# Patient Record
Sex: MALE | Race: ASIAN | NOT HISPANIC OR LATINO
[De-identification: names, ages, dates, MRNs, and addresses within clinical notes are randomized per-mention and may not be internally consistent; named-entity substitution may affect disease eponyms.]

---

## 2019-06-07 ENCOUNTER — APPOINTMENT (OUTPATIENT)
Dept: CARDIOLOGY | Facility: CLINIC | Age: 64
End: 2019-06-07
Payer: MEDICAID

## 2019-06-07 VITALS
BODY MASS INDEX: 23.46 KG/M2 | TEMPERATURE: 97.6 F | HEART RATE: 76 BPM | RESPIRATION RATE: 17 BRPM | OXYGEN SATURATION: 96 % | SYSTOLIC BLOOD PRESSURE: 151 MMHG | WEIGHT: 146 LBS | HEIGHT: 66 IN | DIASTOLIC BLOOD PRESSURE: 90 MMHG

## 2019-06-07 DIAGNOSIS — N40.0 BENIGN PROSTATIC HYPERPLASIA WITHOUT LOWER URINARY TRACT SYMPMS: ICD-10-CM

## 2019-06-07 DIAGNOSIS — F17.200 NICOTINE DEPENDENCE, UNSPECIFIED, UNCOMPLICATED: ICD-10-CM

## 2019-06-07 DIAGNOSIS — J45.909 UNSPECIFIED ASTHMA, UNCOMPLICATED: ICD-10-CM

## 2019-06-07 PROBLEM — Z00.00 ENCOUNTER FOR PREVENTIVE HEALTH EXAMINATION: Status: ACTIVE | Noted: 2019-06-07

## 2019-06-07 PROCEDURE — 93015 CV STRESS TEST SUPVJ I&R: CPT

## 2019-06-07 PROCEDURE — 93224 XTRNL ECG REC UP TO 48 HRS: CPT

## 2019-06-07 PROCEDURE — 93306 TTE W/DOPPLER COMPLETE: CPT

## 2019-06-07 PROCEDURE — 99204 OFFICE O/P NEW MOD 45 MIN: CPT | Mod: 25

## 2019-06-07 RX ORDER — NICOTINE TRANSDERMAL SYSTEM 14 MG/24H
14 PATCH, EXTENDED RELEASE TRANSDERMAL DAILY
Qty: 30 | Refills: 0 | Status: ACTIVE | COMMUNITY
Start: 2019-06-07 | End: 1900-01-01

## 2019-06-07 RX ORDER — NICOTINE TRANSDERMAL SYSTEM 7 MG/24H
7 PATCH, EXTENDED RELEASE TRANSDERMAL DAILY
Qty: 30 | Refills: 1 | Status: ACTIVE | COMMUNITY
Start: 2019-06-07 | End: 1900-01-01

## 2019-06-10 PROBLEM — F17.200 CURRENT SMOKER: Status: ACTIVE | Noted: 2019-06-10

## 2019-06-10 PROBLEM — J45.909 ASTHMA: Status: ACTIVE | Noted: 2019-06-10

## 2019-06-10 PROBLEM — N40.0 BPH (BENIGN PROSTATIC HYPERPLASIA): Status: ACTIVE | Noted: 2019-06-10

## 2019-06-10 RX ORDER — MONTELUKAST 10 MG/1
10 TABLET, FILM COATED ORAL
Qty: 30 | Refills: 0 | Status: ACTIVE | COMMUNITY
Start: 2018-12-29

## 2019-06-10 RX ORDER — ALBUTEROL SULFATE 90 UG/1
108 (90 BASE) AEROSOL, METERED RESPIRATORY (INHALATION)
Qty: 18 | Refills: 0 | Status: ACTIVE | COMMUNITY
Start: 2018-12-07

## 2019-06-10 NOTE — PHYSICAL EXAM
[General Appearance - Well Developed] : well developed [Normal Appearance] : normal appearance [Well Groomed] : well groomed [General Appearance - Well Nourished] : well nourished [No Deformities] : no deformities [General Appearance - In No Acute Distress] : no acute distress [Eyelids - No Xanthelasma] : the eyelids demonstrated no xanthelasmas [Normal Conjunctiva] : the conjunctiva exhibited no abnormalities [Normal Oral Mucosa] : normal oral mucosa [No Oral Pallor] : no oral pallor [Normal Jugular Venous A Waves Present] : normal jugular venous A waves present [No Oral Cyanosis] : no oral cyanosis [No Jugular Venous Cooley A Waves] : no jugular venous cooley A waves [Normal Jugular Venous V Waves Present] : normal jugular venous V waves present [Heart Sounds] : normal S1 and S2 [Heart Rate And Rhythm] : heart rate and rhythm were normal [Arterial Pulses Normal] : the arterial pulses were normal [Murmurs] : no murmurs present [Edema] : no peripheral edema present [Respiration, Rhythm And Depth] : normal respiratory rhythm and effort [Exaggerated Use Of Accessory Muscles For Inspiration] : no accessory muscle use [Auscultation Breath Sounds / Voice Sounds] : lungs were clear to auscultation bilaterally [Abdomen Tenderness] : non-tender [Abdomen Soft] : soft [Abdomen Mass (___ Cm)] : no abdominal mass palpated [Abnormal Walk] : normal gait [Gait - Sufficient For Exercise Testing] : the gait was sufficient for exercise testing [Petechial Hemorrhages (___cm)] : no petechial hemorrhages [Nail Clubbing] : no clubbing of the fingernails [Cyanosis, Localized] : no localized cyanosis [] : no ischemic changes [Oriented To Time, Place, And Person] : oriented to person, place, and time [Affect] : the affect was normal [Mood] : the mood was normal [No Anxiety] : not feeling anxious

## 2019-06-13 ENCOUNTER — NON-APPOINTMENT (OUTPATIENT)
Age: 64
End: 2019-06-13

## 2019-06-13 NOTE — HISTORY OF PRESENT ILLNESS
[FreeTextEntry1] : 63 year-old male smoker with HTN, asthma, BPH, presents for evaluation of CP.  Patient reports that a month ago he was experiencing CP not related to exertion.  Patient reports HAAS which he attributes to asthma.  Patient reports palpitations lasting seconds.  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  Patient reports that recently at night as he is falling asleep he has been experiencing sudden body jerks waking him up.  He is concerned because he has never had it before.

## 2019-06-13 NOTE — ADDENDUM
[FreeTextEntry1] : I was notified that patient will be undergoing cataract surgery on 6/11/19.  Patient reports no cardiac history.  Patient underwent an echocardiogram and it showed normal LV function without significant valvular pathology. Patient underwent a treadmill stress test and completed 6 minutes of Yury protocol.  There were upsloping ST depressions on ECG but no symptoms.  Patient appears to be at low risk for having significant CAD.  Patient is therefore cleared for surgery and anesthesia.  [facility]

## 2019-06-13 NOTE — DISCUSSION/SUMMARY
[FreeTextEntry1] : 63 year-old male smoker with HTN, asthma, BPH, presents for evaluation of CP.  Patient reports that a month ago he was experiencing CP not related to exertion.  Patient reports HAAS which he attributes to asthma.  Patient reports palpitations lasting seconds.  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  Patient reports that recently at night as he is falling asleep he has been experiencing sudden body jerks waking him up.  He is concerned because he has never had it before.  \par \par (1) CP, non-exertional, HAAS - Patient underwent an echocardiogram and it showed normal LV function without significant valvular pathology. Patient underwent a treadmill stress test and completed 6 minutes of Yury protocol.  There were upsloping ST depressions on ECG but no symptoms.  Patient appears to be at low risk for having significant CAD.  I advised patient to quit smoking.  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  I advised patient to wear a Holter monitor to rule out arrhythmia.\par \par (3) Followup - pending Holter.

## 2019-06-20 ENCOUNTER — RESULT REVIEW (OUTPATIENT)
Age: 64
End: 2019-06-20

## 2021-08-12 ENCOUNTER — APPOINTMENT (OUTPATIENT)
Dept: CARDIOLOGY | Facility: CLINIC | Age: 66
End: 2021-08-12
Payer: MEDICARE

## 2021-08-12 ENCOUNTER — NON-APPOINTMENT (OUTPATIENT)
Age: 66
End: 2021-08-12

## 2021-08-12 VITALS
OXYGEN SATURATION: 97 % | WEIGHT: 155 LBS | RESPIRATION RATE: 17 BRPM | TEMPERATURE: 98 F | BODY MASS INDEX: 25.02 KG/M2 | HEART RATE: 87 BPM | SYSTOLIC BLOOD PRESSURE: 115 MMHG | DIASTOLIC BLOOD PRESSURE: 74 MMHG

## 2021-08-12 DIAGNOSIS — Z01.810 ENCOUNTER FOR PREPROCEDURAL CARDIOVASCULAR EXAMINATION: ICD-10-CM

## 2021-08-12 DIAGNOSIS — R00.2 PALPITATIONS: ICD-10-CM

## 2021-08-12 PROCEDURE — 93000 ELECTROCARDIOGRAM COMPLETE: CPT | Mod: 59

## 2021-08-12 PROCEDURE — 93015 CV STRESS TEST SUPVJ I&R: CPT

## 2021-08-12 PROCEDURE — 93306 TTE W/DOPPLER COMPLETE: CPT

## 2021-08-12 PROCEDURE — 99214 OFFICE O/P EST MOD 30 MIN: CPT | Mod: 25

## 2021-08-12 RX ORDER — TRAZODONE HYDROCHLORIDE 50 MG/1
50 TABLET ORAL
Refills: 0 | Status: ACTIVE | COMMUNITY

## 2021-08-12 RX ORDER — TAMSULOSIN HYDROCHLORIDE 0.4 MG/1
0.4 CAPSULE ORAL
Refills: 0 | Status: ACTIVE | COMMUNITY
Start: 2018-12-21

## 2021-08-12 RX ORDER — IPRATROPIUM BROMIDE AND ALBUTEROL 20; 100 UG/1; UG/1
20-100 SPRAY, METERED RESPIRATORY (INHALATION)
Refills: 0 | Status: ACTIVE | COMMUNITY
Start: 2019-06-06

## 2021-08-12 RX ORDER — ATORVASTATIN CALCIUM 20 MG/1
20 TABLET, FILM COATED ORAL
Refills: 0 | Status: ACTIVE | COMMUNITY

## 2021-08-12 RX ORDER — AMLODIPINE BESYLATE 5 MG/1
5 TABLET ORAL
Refills: 0 | Status: ACTIVE | COMMUNITY
Start: 2019-01-24

## 2021-08-12 NOTE — PHYSICAL EXAM
[General Appearance - Well Developed] : well developed [Normal Appearance] : normal appearance [Well Groomed] : well groomed [General Appearance - Well Nourished] : well nourished [No Deformities] : no deformities [General Appearance - In No Acute Distress] : no acute distress [Normal Conjunctiva] : the conjunctiva exhibited no abnormalities [Eyelids - No Xanthelasma] : the eyelids demonstrated no xanthelasmas [Normal Oral Mucosa] : normal oral mucosa [No Oral Pallor] : no oral pallor [No Oral Cyanosis] : no oral cyanosis [Normal Jugular Venous A Waves Present] : normal jugular venous A waves present [Normal Jugular Venous V Waves Present] : normal jugular venous V waves present [No Jugular Venous Cooley A Waves] : no jugular venous cooley A waves [Heart Rate And Rhythm] : heart rate and rhythm were normal [Heart Sounds] : normal S1 and S2 [Murmurs] : no murmurs present [Arterial Pulses Normal] : the arterial pulses were normal [Edema] : no peripheral edema present [Respiration, Rhythm And Depth] : normal respiratory rhythm and effort [Exaggerated Use Of Accessory Muscles For Inspiration] : no accessory muscle use [Auscultation Breath Sounds / Voice Sounds] : lungs were clear to auscultation bilaterally [Abdomen Soft] : soft [Abdomen Tenderness] : non-tender [Abdomen Mass (___ Cm)] : no abdominal mass palpated [Abnormal Walk] : normal gait [Gait - Sufficient For Exercise Testing] : the gait was sufficient for exercise testing [Nail Clubbing] : no clubbing of the fingernails [Cyanosis, Localized] : no localized cyanosis [Petechial Hemorrhages (___cm)] : no petechial hemorrhages [] : no ischemic changes [Oriented To Time, Place, And Person] : oriented to person, place, and time [Affect] : the affect was normal [Mood] : the mood was normal [No Anxiety] : not feeling anxious

## 2021-08-27 PROBLEM — Z01.810 PRE-OPERATIVE CARDIOVASCULAR EXAMINATION: Status: ACTIVE | Noted: 2021-08-27

## 2021-08-27 PROBLEM — R00.2 PALPITATIONS: Status: ACTIVE | Noted: 2019-06-07

## 2022-04-06 NOTE — DISCUSSION/SUMMARY
[FreeTextEntry1] : 66 year-old male smoker with HTN, asthma, BPH, presents for followup.  \par \par Patient was last seen on 6/7/19 for evaluation of CP.   Patient underwent an echocardiogram and it showed normal LV function without significant valvular pathology. Patient underwent a treadmill stress test and completed 6 minutes of Yury protocol.  There were upsloping ST depressions on ECG but no symptoms.  Following treadmill stress, there was no echocardiographic evidence of ischemia.  Patient wore a Holter and it showed APC's, PVC's, without significant arrhythmia. \par \par 8/12/21 - Patient is here for clearance for EGD. Patient was also told by pulmonologist to get cardiac evaluation for SOB. Patient reports SOB for many years. He reports palpitations lasting seconds. Patient denies CP. Patient is on Amlodipine 5 mg and Lipitor 20 mg and not taking Aspirin. Patient reports negative CXR but feels throat discomfort at rest as if something is stuck, not related to exertion. Patient reports frequent sinus congestion. ECG for SOB ok.  I advised patient to undergo an echocardiogram and a treadmill stress test. \par \par (1) Cardiac clearance prior to EGD - Patient reports no cardiac history.  Patient reports HAAS.  Patient underwent an echocardiogram and it showed normal LV function without significant valvular pathology. Patient underwent a treadmill stress test and completed 5 minutes of Yury protocol.  There were upsloping ST depressions on ECG but no symptoms.  Following treadmill stress, there was no echocardiographic evidence of ischemia.  Patient is cleared for EGD and anesthesia. \par \par (2) HTN - His BP was good.  I advised patient to continue Amlodipine 5 mg.\par \par (3) Followup - as needed.

## 2022-04-06 NOTE — HISTORY OF PRESENT ILLNESS
[FreeTextEntry1] : \par 8/12/21 - Patient is here for clearance for EGD. Patient was also told by pulmonologist to get cardiac evaluation for SOB. Patient reports SOB for many years. He reports palpitations lasting seconds. Patient denies CP. Patient is on Amlodipine 5 mg and Lipitor 20 mg and not taking Aspirin. Patient reports negative CXR but feels throat discomfort at rest as if something is stuck, not related to exertion. Patient reports frequent sinus congestion. ECG for SOB ok.  I advised patient to undergo an echocardiogram and a treadmill stress test. \par \par 6/7/19 - Patient reports that a month ago he was experiencing CP not related to exertion.  Patient reports HAAS which he attributes to asthma.  Patient reports palpitations lasting seconds.  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  Patient reports that recently at night as he is falling asleep he has been experiencing sudden body jerks waking him up.  He is concerned because he has never had it before.

## 2022-04-06 NOTE — ADDENDUM
[FreeTextEntry1] : I was notified that patient will be undergoing cataract surgery on 6/11/19.  Patient reports no cardiac history.  Patient underwent an echocardiogram and it showed normal LV function without significant valvular pathology. Patient underwent a treadmill stress test and completed 6 minutes of Yury protocol.  There were upsloping ST depressions on ECG but no symptoms.  Patient appears to be at low risk for having significant CAD.  Patient is therefore cleared for surgery and anesthesia.

## 2022-04-06 NOTE — REASON FOR VISIT
[FreeTextEntry1] : 66 year-old male smoker with HTN, asthma, BPH, presents for followup.  \par \par Patient was last seen on 6/7/19 for evaluation of CP.   Patient underwent an echocardiogram and it showed normal LV function without significant valvular pathology. Patient underwent a treadmill stress test and completed 6 minutes of Yury protocol.  There were upsloping ST depressions on ECG but no symptoms.  Following treadmill stress, there was no echocardiographic evidence of ischemia.  Patient wore a Holter and it showed APC's, PVC's, without significant arrhythmia. \par \par

## 2023-12-12 ENCOUNTER — APPOINTMENT (OUTPATIENT)
Dept: CARDIOLOGY | Facility: CLINIC | Age: 68
End: 2023-12-12
Payer: MEDICARE

## 2023-12-12 VITALS
OXYGEN SATURATION: 97 % | TEMPERATURE: 97.9 F | BODY MASS INDEX: 24.86 KG/M2 | RESPIRATION RATE: 18 BRPM | SYSTOLIC BLOOD PRESSURE: 133 MMHG | WEIGHT: 154 LBS | DIASTOLIC BLOOD PRESSURE: 83 MMHG | HEART RATE: 80 BPM

## 2023-12-12 DIAGNOSIS — R06.02 SHORTNESS OF BREATH: ICD-10-CM

## 2023-12-12 PROCEDURE — 93000 ELECTROCARDIOGRAM COMPLETE: CPT | Mod: 59

## 2023-12-12 PROCEDURE — 93306 TTE W/DOPPLER COMPLETE: CPT

## 2023-12-12 PROCEDURE — 93015 CV STRESS TEST SUPVJ I&R: CPT

## 2023-12-12 PROCEDURE — 99214 OFFICE O/P EST MOD 30 MIN: CPT | Mod: 25

## 2024-04-30 ENCOUNTER — APPOINTMENT (OUTPATIENT)
Dept: CARDIOLOGY | Facility: CLINIC | Age: 69
End: 2024-04-30
Payer: MEDICARE

## 2024-04-30 ENCOUNTER — NON-APPOINTMENT (OUTPATIENT)
Age: 69
End: 2024-04-30

## 2024-04-30 VITALS
BODY MASS INDEX: 24.21 KG/M2 | SYSTOLIC BLOOD PRESSURE: 137 MMHG | RESPIRATION RATE: 18 BRPM | TEMPERATURE: 97.8 F | DIASTOLIC BLOOD PRESSURE: 77 MMHG | WEIGHT: 150 LBS | OXYGEN SATURATION: 97 % | HEART RATE: 77 BPM

## 2024-04-30 DIAGNOSIS — E78.5 HYPERLIPIDEMIA, UNSPECIFIED: ICD-10-CM

## 2024-04-30 DIAGNOSIS — R07.89 OTHER CHEST PAIN: ICD-10-CM

## 2024-04-30 DIAGNOSIS — I10 ESSENTIAL (PRIMARY) HYPERTENSION: ICD-10-CM

## 2024-04-30 PROCEDURE — 93000 ELECTROCARDIOGRAM COMPLETE: CPT

## 2024-04-30 PROCEDURE — G2211 COMPLEX E/M VISIT ADD ON: CPT

## 2024-04-30 PROCEDURE — 99214 OFFICE O/P EST MOD 30 MIN: CPT

## 2024-04-30 NOTE — ASSESSMENT
[FreeTextEntry1] : 68 year-old male smoker with HTN, asthma, BPH, presents for followup.  Pt reports 1 ep of CP 1 month ago, occurring randomly, lasting 5 minutes. It was not related to exertion or food. It has not recurred since then. Pt states he suffers from chronic GERD and takes acid reflux medications twice daily. No SOB, orthopnea, PND, LE edema, dizziness, palpitations, or LOC. He is on Amlodipine 5 mg and Lipitor 20 mg.   1) Chest pain, atypical in nature, not related to exertion 2) HTN, HLD - EKG 4/30/24 showed sinus rhythm without ischemic changes - Pt underwent treadmill stress 12/12/23, did 6 min Yury protocol and had no significant ischemic ECG changes - TTE 12/12/23 which showed normal LVEF 60-65% with normal RV size/function, mild TR, trace MR/AI - Pt would like to monitor symptoms at this time. If symptoms recur, he will consider coronary CT to r/o CAD  3) Follow-up, 3 months

## 2024-04-30 NOTE — HISTORY OF PRESENT ILLNESS
[FreeTextEntry1] : Pt reports 1 ep of CP 1 month ago, occurring randomly, lasting 5 minutes. It was not related to exertion or food. It has not recurred since then. Pt states he suffers from chronic GERD and takes acid reflux medications twice daily. No SOB, orthopnea, PND, LE edema, dizziness, palpitations, or LOC. He is on Amlodipine 5 mg and Lipitor 20 mg.   12/12/23- He reports intermittent R sided "needle-like" chest pain x 2-3 months. It occurs randomly throughout the day and not related to food or exertion. He endorses chronic SOB on exertion. He sees Dr. Childers (pulm) who gave him inhalers for his asthma which he uses as needed. His medications have not changed since last visit. No orthopnea, PND, LE edema, palpitations, dizziness, or LOC.  8/12/21 - Patient is here for clearance for EGD. Patient was also told by pulmonologist to get cardiac evaluation for SOB. Patient reports SOB for many years. He reports palpitations lasting seconds. Patient denies CP. Patient is on Amlodipine 5 mg and Lipitor 20 mg and not taking Aspirin. Patient reports negative CXR but feels throat discomfort at rest as if something is stuck, not related to exertion. Patient reports frequent sinus congestion. ECG for SOB ok. I advised patient to undergo an echocardiogram and a treadmill stress test.  6/7/19 - Patient reports that a month ago he was experiencing CP not related to exertion. Patient reports HAAS which he attributes to asthma. Patient reports palpitations lasting seconds. 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 Patient reports that recently at night as he is falling asleep he has been experiencing sudden body jerks waking him up. He is concerned because he has never had it before.

## 2024-12-03 ENCOUNTER — APPOINTMENT (OUTPATIENT)
Dept: CARDIOLOGY | Facility: CLINIC | Age: 69
End: 2024-12-03
Payer: MEDICARE

## 2024-12-03 ENCOUNTER — NON-APPOINTMENT (OUTPATIENT)
Age: 69
End: 2024-12-03

## 2024-12-03 VITALS
SYSTOLIC BLOOD PRESSURE: 126 MMHG | DIASTOLIC BLOOD PRESSURE: 76 MMHG | RESPIRATION RATE: 16 BRPM | HEART RATE: 91 BPM | OXYGEN SATURATION: 94 %

## 2024-12-03 DIAGNOSIS — I10 ESSENTIAL (PRIMARY) HYPERTENSION: ICD-10-CM

## 2024-12-03 DIAGNOSIS — R06.02 SHORTNESS OF BREATH: ICD-10-CM

## 2024-12-03 DIAGNOSIS — E78.5 HYPERLIPIDEMIA, UNSPECIFIED: ICD-10-CM

## 2024-12-03 DIAGNOSIS — R07.89 OTHER CHEST PAIN: ICD-10-CM

## 2024-12-03 PROCEDURE — 93306 TTE W/DOPPLER COMPLETE: CPT

## 2024-12-03 PROCEDURE — 93015 CV STRESS TEST SUPVJ I&R: CPT

## 2024-12-03 PROCEDURE — 93000 ELECTROCARDIOGRAM COMPLETE: CPT | Mod: 59

## 2024-12-03 PROCEDURE — 99214 OFFICE O/P EST MOD 30 MIN: CPT | Mod: 25

## 2025-01-08 ENCOUNTER — APPOINTMENT (OUTPATIENT)
Dept: CARDIOLOGY | Facility: CLINIC | Age: 70
End: 2025-01-08
Payer: MEDICARE

## 2025-01-08 VITALS
HEART RATE: 82 BPM | WEIGHT: 157 LBS | BODY MASS INDEX: 25.34 KG/M2 | DIASTOLIC BLOOD PRESSURE: 76 MMHG | SYSTOLIC BLOOD PRESSURE: 133 MMHG | RESPIRATION RATE: 16 BRPM | OXYGEN SATURATION: 96 %

## 2025-01-08 DIAGNOSIS — I10 ESSENTIAL (PRIMARY) HYPERTENSION: ICD-10-CM

## 2025-01-08 DIAGNOSIS — R06.02 SHORTNESS OF BREATH: ICD-10-CM

## 2025-01-08 DIAGNOSIS — E78.5 HYPERLIPIDEMIA, UNSPECIFIED: ICD-10-CM

## 2025-01-08 DIAGNOSIS — R07.89 OTHER CHEST PAIN: ICD-10-CM

## 2025-01-08 DIAGNOSIS — I25.10 ATHEROSCLEROTIC HEART DISEASE OF NATIVE CORONARY ARTERY W/OUT ANGINA PECTORIS: ICD-10-CM

## 2025-01-08 PROCEDURE — 99214 OFFICE O/P EST MOD 30 MIN: CPT

## 2025-01-08 PROCEDURE — G2211 COMPLEX E/M VISIT ADD ON: CPT

## 2025-01-08 RX ORDER — ASPIRIN 81 MG/1
81 TABLET, COATED ORAL DAILY
Qty: 90 | Refills: 1 | Status: ACTIVE | COMMUNITY
Start: 2025-01-08 | End: 1900-01-01

## 2025-01-08 RX ORDER — ROSUVASTATIN CALCIUM 40 MG/1
40 TABLET, FILM COATED ORAL DAILY
Qty: 90 | Refills: 1 | Status: ACTIVE | COMMUNITY
Start: 2025-01-08 | End: 1900-01-01

## 2025-04-09 ENCOUNTER — NON-APPOINTMENT (OUTPATIENT)
Age: 70
End: 2025-04-09

## 2025-04-09 ENCOUNTER — APPOINTMENT (OUTPATIENT)
Dept: CARDIOLOGY | Facility: CLINIC | Age: 70
End: 2025-04-09
Payer: MEDICARE

## 2025-04-09 VITALS
OXYGEN SATURATION: 95 % | WEIGHT: 155 LBS | BODY MASS INDEX: 25.02 KG/M2 | SYSTOLIC BLOOD PRESSURE: 137 MMHG | RESPIRATION RATE: 16 BRPM | DIASTOLIC BLOOD PRESSURE: 86 MMHG | HEART RATE: 90 BPM

## 2025-04-09 DIAGNOSIS — I25.10 ATHEROSCLEROTIC HEART DISEASE OF NATIVE CORONARY ARTERY W/OUT ANGINA PECTORIS: ICD-10-CM

## 2025-04-09 DIAGNOSIS — I10 ESSENTIAL (PRIMARY) HYPERTENSION: ICD-10-CM

## 2025-04-09 DIAGNOSIS — E78.5 HYPERLIPIDEMIA, UNSPECIFIED: ICD-10-CM

## 2025-04-09 PROCEDURE — 93000 ELECTROCARDIOGRAM COMPLETE: CPT

## 2025-04-09 PROCEDURE — G2211 COMPLEX E/M VISIT ADD ON: CPT

## 2025-04-09 PROCEDURE — 99214 OFFICE O/P EST MOD 30 MIN: CPT

## 2025-06-25 ENCOUNTER — APPOINTMENT (OUTPATIENT)
Dept: CARDIOLOGY | Facility: CLINIC | Age: 70
End: 2025-06-25
Payer: MEDICARE

## 2025-06-25 VITALS
BODY MASS INDEX: 24.21 KG/M2 | RESPIRATION RATE: 18 BRPM | HEART RATE: 83 BPM | SYSTOLIC BLOOD PRESSURE: 136 MMHG | WEIGHT: 150 LBS | OXYGEN SATURATION: 97 % | DIASTOLIC BLOOD PRESSURE: 82 MMHG

## 2025-06-25 PROCEDURE — 99214 OFFICE O/P EST MOD 30 MIN: CPT

## 2025-06-25 PROCEDURE — G2211 COMPLEX E/M VISIT ADD ON: CPT

## 2025-08-29 ENCOUNTER — APPOINTMENT (OUTPATIENT)
Dept: CARDIOLOGY | Facility: CLINIC | Age: 70
End: 2025-08-29
Payer: MEDICARE

## 2025-08-29 VITALS
OXYGEN SATURATION: 96 % | HEART RATE: 75 BPM | WEIGHT: 150 LBS | RESPIRATION RATE: 18 BRPM | BODY MASS INDEX: 24.21 KG/M2 | DIASTOLIC BLOOD PRESSURE: 64 MMHG | SYSTOLIC BLOOD PRESSURE: 112 MMHG

## 2025-08-29 DIAGNOSIS — R42 DIZZINESS AND GIDDINESS: ICD-10-CM

## 2025-08-29 DIAGNOSIS — I10 ESSENTIAL (PRIMARY) HYPERTENSION: ICD-10-CM

## 2025-08-29 DIAGNOSIS — I25.10 ATHEROSCLEROTIC HEART DISEASE OF NATIVE CORONARY ARTERY W/OUT ANGINA PECTORIS: ICD-10-CM

## 2025-08-29 DIAGNOSIS — E78.5 HYPERLIPIDEMIA, UNSPECIFIED: ICD-10-CM

## 2025-08-29 DIAGNOSIS — R07.89 OTHER CHEST PAIN: ICD-10-CM

## 2025-08-29 PROCEDURE — G2211 COMPLEX E/M VISIT ADD ON: CPT

## 2025-08-29 PROCEDURE — 93000 ELECTROCARDIOGRAM COMPLETE: CPT

## 2025-08-29 PROCEDURE — 99214 OFFICE O/P EST MOD 30 MIN: CPT

## 2025-09-17 ENCOUNTER — NON-APPOINTMENT (OUTPATIENT)
Age: 70
End: 2025-09-17

## 2025-09-18 RX ORDER — METOPROLOL SUCCINATE 25 MG/1
25 TABLET, EXTENDED RELEASE ORAL DAILY
Qty: 90 | Refills: 1 | Status: ACTIVE | COMMUNITY
Start: 2025-09-18 | End: 1900-01-01